# Patient Record
Sex: FEMALE | Employment: UNEMPLOYED | URBAN - METROPOLITAN AREA
[De-identification: names, ages, dates, MRNs, and addresses within clinical notes are randomized per-mention and may not be internally consistent; named-entity substitution may affect disease eponyms.]

---

## 2017-10-25 ENCOUNTER — HOSPITAL ENCOUNTER (EMERGENCY)
Age: 57
Discharge: HOME | End: 2017-10-25
Attending: EMERGENCY MEDICINE
Payer: SELF-PAY

## 2017-10-25 ENCOUNTER — APPOINTMENT (OUTPATIENT)
Dept: GENERAL RADIOLOGY | Age: 57
End: 2017-10-25
Attending: EMERGENCY MEDICINE
Payer: SELF-PAY

## 2017-10-25 VITALS
HEIGHT: 61 IN | SYSTOLIC BLOOD PRESSURE: 122 MMHG | WEIGHT: 120 LBS | TEMPERATURE: 98 F | BODY MASS INDEX: 22.66 KG/M2 | DIASTOLIC BLOOD PRESSURE: 77 MMHG | RESPIRATION RATE: 16 BRPM | OXYGEN SATURATION: 98 % | HEART RATE: 80 BPM

## 2017-10-25 DIAGNOSIS — R00.0 TACHYCARDIA: Primary | ICD-10-CM

## 2017-10-25 LAB
ANION GAP SERPL CALC-SCNC: 5 MMOL/L (ref 3–18)
BASOPHILS # BLD: 0.1 K/UL (ref 0–0.06)
BASOPHILS NFR BLD: 1 % (ref 0–2)
BUN SERPL-MCNC: 13 MG/DL (ref 7–18)
BUN/CREAT SERPL: 12 (ref 12–20)
CALCIUM SERPL-MCNC: 9.1 MG/DL (ref 8.5–10.1)
CHLORIDE SERPL-SCNC: 109 MMOL/L (ref 100–108)
CK MB CFR SERPL CALC: 0.6 % (ref 0–4)
CK MB SERPL-MCNC: 1.3 NG/ML (ref 5–25)
CK SERPL-CCNC: 212 U/L (ref 26–192)
CO2 SERPL-SCNC: 29 MMOL/L (ref 21–32)
CREAT SERPL-MCNC: 1.05 MG/DL (ref 0.6–1.3)
D DIMER PPP FEU-MCNC: 0.29 UG/ML(FEU)
DIFFERENTIAL METHOD BLD: ABNORMAL
EOSINOPHIL # BLD: 0.3 K/UL (ref 0–0.4)
EOSINOPHIL NFR BLD: 3 % (ref 0–5)
ERYTHROCYTE [DISTWIDTH] IN BLOOD BY AUTOMATED COUNT: 12.7 % (ref 11.6–14.5)
GLUCOSE SERPL-MCNC: 106 MG/DL (ref 74–99)
HCT VFR BLD AUTO: 41.1 % (ref 35–45)
HGB BLD-MCNC: 14.1 G/DL (ref 12–16)
LYMPHOCYTES # BLD: 2.9 K/UL (ref 0.9–3.6)
LYMPHOCYTES NFR BLD: 33 % (ref 21–52)
MAGNESIUM SERPL-MCNC: 2.2 MG/DL (ref 1.6–2.6)
MCH RBC QN AUTO: 30.1 PG (ref 24–34)
MCHC RBC AUTO-ENTMCNC: 34.3 G/DL (ref 31–37)
MCV RBC AUTO: 87.6 FL (ref 74–97)
MONOCYTES # BLD: 0.6 K/UL (ref 0.05–1.2)
MONOCYTES NFR BLD: 7 % (ref 3–10)
NEUTS SEG # BLD: 4.9 K/UL (ref 1.8–8)
NEUTS SEG NFR BLD: 56 % (ref 40–73)
PLATELET # BLD AUTO: 237 K/UL (ref 135–420)
PMV BLD AUTO: 10 FL (ref 9.2–11.8)
POTASSIUM SERPL-SCNC: 3.4 MMOL/L (ref 3.5–5.5)
RBC # BLD AUTO: 4.69 M/UL (ref 4.2–5.3)
SODIUM SERPL-SCNC: 143 MMOL/L (ref 136–145)
TROPONIN I SERPL-MCNC: <0.02 NG/ML (ref 0–0.06)
TSH SERPL DL<=0.05 MIU/L-ACNC: 2.02 UIU/ML (ref 0.36–3.74)
WBC # BLD AUTO: 8.7 K/UL (ref 4.6–13.2)

## 2017-10-25 PROCEDURE — 74011250637 HC RX REV CODE- 250/637: Performed by: EMERGENCY MEDICINE

## 2017-10-25 PROCEDURE — 74011250636 HC RX REV CODE- 250/636: Performed by: EMERGENCY MEDICINE

## 2017-10-25 PROCEDURE — 85379 FIBRIN DEGRADATION QUANT: CPT | Performed by: EMERGENCY MEDICINE

## 2017-10-25 PROCEDURE — 93005 ELECTROCARDIOGRAM TRACING: CPT

## 2017-10-25 PROCEDURE — 84443 ASSAY THYROID STIM HORMONE: CPT | Performed by: EMERGENCY MEDICINE

## 2017-10-25 PROCEDURE — 85025 COMPLETE CBC W/AUTO DIFF WBC: CPT | Performed by: EMERGENCY MEDICINE

## 2017-10-25 PROCEDURE — 80048 BASIC METABOLIC PNL TOTAL CA: CPT | Performed by: EMERGENCY MEDICINE

## 2017-10-25 PROCEDURE — 82550 ASSAY OF CK (CPK): CPT | Performed by: EMERGENCY MEDICINE

## 2017-10-25 PROCEDURE — 99285 EMERGENCY DEPT VISIT HI MDM: CPT

## 2017-10-25 PROCEDURE — 96361 HYDRATE IV INFUSION ADD-ON: CPT

## 2017-10-25 PROCEDURE — 96360 HYDRATION IV INFUSION INIT: CPT

## 2017-10-25 PROCEDURE — 83735 ASSAY OF MAGNESIUM: CPT | Performed by: EMERGENCY MEDICINE

## 2017-10-25 RX ORDER — ALPRAZOLAM 0.25 MG/1
TABLET ORAL
COMMUNITY

## 2017-10-25 RX ORDER — SODIUM CHLORIDE 9 MG/ML
1000 INJECTION, SOLUTION INTRAVENOUS ONCE
Status: COMPLETED | OUTPATIENT
Start: 2017-10-25 | End: 2017-10-25

## 2017-10-25 RX ORDER — POTASSIUM CHLORIDE 20 MEQ/1
20 TABLET, EXTENDED RELEASE ORAL
Status: COMPLETED | OUTPATIENT
Start: 2017-10-25 | End: 2017-10-25

## 2017-10-25 RX ADMIN — SODIUM CHLORIDE 1000 ML: 900 INJECTION, SOLUTION INTRAVENOUS at 20:51

## 2017-10-25 RX ADMIN — POTASSIUM CHLORIDE 20 MEQ: 20 TABLET, EXTENDED RELEASE ORAL at 22:16

## 2017-10-26 LAB
ATRIAL RATE: 98 BPM
CALCULATED P AXIS, ECG09: 63 DEGREES
CALCULATED R AXIS, ECG10: 67 DEGREES
CALCULATED T AXIS, ECG11: 51 DEGREES
DIAGNOSIS, 93000: NORMAL
P-R INTERVAL, ECG05: 156 MS
Q-T INTERVAL, ECG07: 338 MS
QRS DURATION, ECG06: 76 MS
QTC CALCULATION (BEZET), ECG08: 431 MS
VENTRICULAR RATE, ECG03: 98 BPM

## 2017-10-26 NOTE — DISCHARGE INSTRUCTIONS
Return for pain, fever, shortness of breath, vomiting, decreased fluid intake, weakness, numbness, dizziness, or any change or concerns. Supraventricular Tachycardia: Care Instructions  Your Care Instructions    Having supraventricular tachycardia (SVT) means that from time to time your heart beats abnormally fast. This fast rhythm is caused by changes in the electrical system of your heart. You may feel a fluttering in your chest (palpitations) and have a fast pulse. When your heart is beating fast, you may feel anxious and lightheaded, be short of breath, and feel discomfort in the chest.  Your doctor may prescribe medicines to help slow down your heartbeat. Your doctor may also suggest you try vagal maneuvers when having an episode of SVT. These are things, like bearing down, that might help slow your heart rate. Bearing down means that you try to breathe out with your stomach muscles but you don't let air out of your nose or mouth. Your doctor can show you how to do vagal maneuvers. He or she may suggest you lie down on your back to do them. In some cases, either cardioversion treatment or a procedure called catheter ablation is done to correct SVT. Your doctor may ask you to wear a small electronic device for 1 or 2 days to monitor your heart. It is called a Holter monitor. Follow-up care is a key part of your treatment and safety. Be sure to make and go to all appointments, and call your doctor if you are having problems. It's also a good idea to know your test results and keep a list of the medicines you take. How can you care for yourself at home? · Be safe with medicines. Take your medicines exactly as prescribed. Call your doctor if you think you are having a problem with your medicine. You will get more details on the specific medicines your doctor prescribes. · If your doctor showed you how to do vagal maneuvers, try them when you have an episode.  These maneuvers include bearing down or putting an ice-cold, wet towel on your face. · Monitor your condition by keeping a diary of your SVT episodes. Bring this to your doctor appointments. ¨ Write down how fast or slow your heart was beating. To count your heart rate:  § Gently place 2 fingers of your hand on the inside of your other wrist, below your thumb. § Count the beats for 30 seconds. § Then, double the result to get the number of beats per minute. ¨ Write down if your heart rhythm was regular or irregular. ¨ Write down the symptoms you had. ¨ Write down the time of day your symptoms occurred. ¨ Write down how long your symptoms lasted. ¨ Write down what you were doing when your symptoms started. ¨ Write down what may have helped your symptoms go away. · If they trigger episodes, limit or avoid alcohol or drinks with caffeine. · Do not use over-the-counter decongestants, herbal remedies, diet pills, or \"pep\" pills, which often contain stimulants. · Do not use illegal drugs, such as cocaine, ecstasy, or methamphetamine, which can speed up your heart's rhythm. · Do not smoke. Smoking can make this condition worse. If you need help quitting, talk to your doctor about stop-smoking programs and medicines. These can increase your chances of quitting for good. · Be alert for new or worsening symptoms, such as shortness of breath, pounding of your heart, or unusual tiredness. If new symptoms develop or your symptoms become worse, call your doctor. When should you call for help? Call 911 anytime you think you may need emergency care. For example, call if:  ? · You passed out (lost consciousness). ? · You are short of breath. ?Call your doctor now or seek immediate medical care if:  ? · You have a fast heartbeat. ? · You are dizzy or lightheaded, or feel like you may faint. ? Watch closely for changes in your health, and be sure to contact your doctor if:  ? · You do not get better as expected. Where can you learn more?   Go to http://izabela-deidre.info/. Enter G244 in the search box to learn more about \"Supraventricular Tachycardia: Care Instructions. \"  Current as of: September 21, 2016  Content Version: 11.4  © 8365-3628 [employer]. Care instructions adapted under license by [employer] (which disclaims liability or warranty for this information). If you have questions about a medical condition or this instruction, always ask your healthcare professional. [place] [address] any warranty or liability for your use of this information. Palpitations: Care Instructions  Your Care Instructions    Heart palpitations are the uncomfortable sensation that your heart is beating fast or irregularly. You might feel pounding or fluttering in your chest. It might feel like your heart is skipping a beat. Although palpitations may be caused by a heart problem, they also occur because of stress, fatigue, or use of alcohol, caffeine, or nicotine. Many medicines, including diet pills, antihistamines, decongestants, and some herbal products, can cause heart palpitations. Nearly everyone has palpitations from time to time. Depending on your symptoms, your doctor may need to do more tests to try to find the cause of your palpitations. Follow-up care is a key part of your treatment and safety. Be sure to make and go to all appointments, and call your doctor if you are having problems. It's also a good idea to know your test results and keep a list of the medicines you take. How can you care for yourself at home? · Avoid caffeine, nicotine, and excess alcohol. · Do not take illegal drugs, such as methamphetamines and cocaine. · Do not take weight loss or diet medicines unless you talk with your doctor first.  · Get plenty of sleep. · Do not overeat. · If you have palpitations again, take deep breaths and try to relax. This may slow a racing heart.   · If you start to feel lightheaded, lie down to avoid injuries that might result if you pass out and fall down. · Keep a record of your palpitations and bring it to your next doctor's appointment. Write down:  ¨ The date and time. ¨ Your pulse. (If your heart is beating fast, it may be hard to count your pulse.)  ¨ What you were doing when the palpitations started. ¨ How long the palpitations lasted. ¨ Any other symptoms. · If an activity causes palpitations, slow down or stop. Talk to your doctor before you do that activity again. · Take your medicines exactly as prescribed. Call your doctor if you think you are having a problem with your medicine. When should you call for help? Call 911 anytime you think you may need emergency care. For example, call if:  ? · You passed out (lost consciousness). ? · You have symptoms of a heart attack. These may include:  ¨ Chest pain or pressure, or a strange feeling in the chest.  ¨ Sweating. ¨ Shortness of breath. ¨ Pain, pressure, or a strange feeling in the back, neck, jaw, or upper belly or in one or both shoulders or arms. ¨ Lightheadedness or sudden weakness. ¨ A fast or irregular heartbeat. After you call 911, the  may tell you to chew 1 adult-strength or 2 to 4 low-dose aspirin. Wait for an ambulance. Do not try to drive yourself. ? · You have symptoms of a stroke. These may include:  ¨ Sudden numbness, tingling, weakness, or loss of movement in your face, arm, or leg, especially on only one side of your body. ¨ Sudden vision changes. ¨ Sudden trouble speaking. ¨ Sudden confusion or trouble understanding simple statements. ¨ Sudden problems with walking or balance. ¨ A sudden, severe headache that is different from past headaches. ?Call your doctor now or seek immediate medical care if:  ? · You have heart palpitations and:  ¨ Are dizzy or lightheaded, or you feel like you may faint. ¨ Have new or increased shortness of breath. ? Watch closely for changes in your health, and be sure to contact your doctor if:  ? · You continue to have heart palpitations. Where can you learn more? Go to http://izabela-deidre.info/. Enter R508 in the search box to learn more about \"Palpitations: Care Instructions. \"  Current as of: September 21, 2016  Content Version: 11.4  © 0729-0804 [employer]. Care instructions adapted under license by [employer] (which disclaims liability or warranty for this information). If you have questions about a medical condition or this instruction, always ask your healthcare professional. [place] [address] any warranty or liability for your use of this information.

## 2017-10-26 NOTE — ED PROVIDER NOTES
HPI Comments: 8:41 PM Syd Agrawal is a 55 y.o. female with a h/o SVT presents to the ED with c/o rapid heart rate onset today. Long h/o same, off and on for yrs. No fever. No sob. No chest pain. Sx's x 2 hours, constant, better now. Pt states she drank a Coke today. Pt reported dizziness, and fatigue. Pt denied CP, n/v/d, SOB, fever, weakness, numbness, ebenezer, or cough. All other sx denied. No other complaints at this time. PCP-No primary care provider on file. Patient is a 55 y.o. female presenting with rapid heart beat. The history is provided by the patient. Rapid Heart Rate   Pertinent negatives include no chest pain, no abdominal pain and no shortness of breath. Past Medical History:   Diagnosis Date    Hypercholesteremia     SVT (supraventricular tachycardia) ([facility] [address]        History reviewed. No pertinent surgical history. History reviewed. No pertinent family history. Social History     Social History    Marital status:      Spouse name: N/A    Number of children: N/A    Years of education: N/A     Occupational History    Not on file. Social History Main Topics    Smoking status: Current Every Day Smoker     Packs/day: 1.00    Smokeless tobacco: Not on file    Alcohol use No    Drug use: No    Sexual activity: Not on file     Other Topics Concern    Not on file     Social History Narrative    No narrative on file         ALLERGIES: Codeine and Penicillins    Review of Systems   Constitutional: Negative for fever. HENT: Negative for congestion. Respiratory: Negative for cough and shortness of breath. Cardiovascular: Positive for palpitations. Negative for chest pain. Gastrointestinal: Negative for abdominal pain, blood in stool, diarrhea, nausea and vomiting. Musculoskeletal: Negative for back pain. Skin: Negative for rash. Neurological: Positive for dizziness. Negative for weakness, light-headedness and numbness.    All other systems reviewed and are negative. Vitals:    10/25/17 2130 10/25/17 2156 10/25/17 2200 10/25/17 2215   BP: 117/81 105/70 116/83 122/77   Pulse: 87 79 77 80   Resp: 15 19 16 16   Temp:       SpO2: 99% 97% 97% 98%   Weight:       Height:                Physical Exam   Constitutional: She is oriented to person, place, and time. She appears well-developed. HENT:   Head: Normocephalic. Mouth/Throat: Oropharynx is clear and moist.   Eyes: Pupils are equal, round, and reactive to light. Neck: Normal range of motion. Cardiovascular: Normal rate and normal heart sounds. No murmur heard. Pulmonary/Chest: Effort normal. She has no wheezes. She has no rales. Abdominal: Soft. There is no tenderness. Musculoskeletal: Normal range of motion. She exhibits no edema. Neurological: She is alert and oriented to person, place, and time. Skin: Skin is warm and dry. Nursing note and vitals reviewed.        [facility]  ED Course       Procedures      Vitals:  Patient Vitals for the past 12 hrs:   Temp Pulse Resp BP SpO2   10/25/17 2215 - 80 16 122/77 98 %   10/25/17 2200 - 77 16 116/83 97 %   10/25/17 2156 - 79 19 105/70 97 %   10/25/17 2130 - 87 15 117/81 99 %   10/25/17 2100 - 88 19 116/84 97 %   10/25/17 2045 - 94 15 131/83 96 %   10/25/17 2042 98 °F (36.7 °C) (!) 108 23 120/86 98 %         Medications ordered:   Medications   0.9% sodium chloride infusion 1,000 mL (0 mL IntraVENous IV Completed 10/25/17 2231)   potassium chloride (K-DUR, KLOR-CON) SR tablet 20 mEq (20 mEq Oral Given 10/25/17 2216)         Lab findings:  Recent Results (from the past 12 hour(s))   EKG, 12 LEAD, INITIAL    Collection Time: 10/25/17  8:43 PM   Result Value Ref Range    Ventricular Rate 98 BPM    Atrial Rate 98 BPM    P-R Interval 156 ms    QRS Duration 76 ms    Q-T Interval 338 ms    QTC Calculation (Bezet) 431 ms    Calculated P Axis 63 degrees    Calculated R Axis 67 degrees    Calculated T Axis 51 degrees    Diagnosis       Normal sinus rhythm  Possible Left atrial enlargement  Borderline ECG  When compared with ECG of 22-FEB-2009 14:41,  No significant change was found     TSH 3RD GENERATION    Collection Time: 10/25/17  8:50 PM   Result Value Ref Range    TSH 2.02 0.36 - 3.74 uIU/mL   CBC WITH AUTOMATED DIFF    Collection Time: 10/25/17  8:50 PM   Result Value Ref Range    WBC 8.7 4.6 - 13.2 K/uL    RBC 4.69 4.20 - 5.30 M/uL    HGB 14.1 12.0 - 16.0 g/dL    HCT 41.1 35.0 - 45.0 %    MCV 87.6 74.0 - 97.0 FL    MCH 30.1 24.0 - 34.0 PG    MCHC 34.3 31.0 - 37.0 g/dL    RDW 12.7 11.6 - 14.5 %    PLATELET 016 961 - 321 K/uL    MPV 10.0 9.2 - 11.8 FL    NEUTROPHILS 56 40 - 73 %    LYMPHOCYTES 33 21 - 52 %    MONOCYTES 7 3 - 10 %    EOSINOPHILS 3 0 - 5 %    BASOPHILS 1 0 - 2 %    ABS. NEUTROPHILS 4.9 1.8 - 8.0 K/UL    ABS. LYMPHOCYTES 2.9 0.9 - 3.6 K/UL    ABS. MONOCYTES 0.6 0.05 - 1.2 K/UL    ABS. EOSINOPHILS 0.3 0.0 - 0.4 K/UL    ABS.  BASOPHILS 0.1 (H) 0.0 - 0.06 K/UL    DF AUTOMATED     METABOLIC PANEL, BASIC    Collection Time: 10/25/17  8:50 PM   Result Value Ref Range    Sodium 143 136 - 145 mmol/L    Potassium 3.4 (L) 3.5 - 5.5 mmol/L    Chloride 109 (H) 100 - 108 mmol/L    CO2 29 21 - 32 mmol/L    Anion gap 5 3.0 - 18 mmol/L    Glucose 106 (H) 74 - 99 mg/dL    BUN 13 7.0 - 18 MG/DL    Creatinine 1.05 0.6 - 1.3 MG/DL    BUN/Creatinine ratio 12 12 - 20      GFR est AA >60 >60 ml/min/1.73m2    GFR est non-AA 54 (L) >60 ml/min/1.73m2    Calcium 9.1 8.5 - 10.1 MG/DL   CARDIAC PANEL,(CK, CKMB & TROPONIN)    Collection Time: 10/25/17  8:50 PM   Result Value Ref Range     (H) 26 - 192 U/L    CK - MB 1.3 <3.6 ng/ml    CK-MB Index 0.6 0.0 - 4.0 %    Troponin-I, Qt. <0.02 0.00 - 0.06 NG/ML   MAGNESIUM    Collection Time: 10/25/17  8:50 PM   Result Value Ref Range    Magnesium 2.2 1.6 - 2.6 mg/dL   D DIMER    Collection Time: 10/25/17  8:50 PM   Result Value Ref Range    D DIMER 0.29 <0.46 ug/ml(FEU)           X-Ray, CT or other radiology findings or impressions:  No orders to display       Progress notes, Consult notes or additional Procedure notes:   10:25 PM  Pt has been re-examined by Cleta Dance, MD.no sx's in ed. Pt declines cxr, declines further ed obs or tx. Wants dc. Says long h/o same. Pt is resting comfortably in bed. Pt was informed of their results. All questions and concerns discussed and answered. Pt declined further tx and wants to go home. Disposition:  Diagnosis:   1. Tachycardia        Disposition: discharge    Follow-up Information     Follow up With Details Comments [address] Schedule an appointment as soon as possible for a visit in 2 days  [address], DO Schedule an appointment as soon as possible for a visit As needed, or your cardiologist [address]  [phone]             Discharge Medication List as of 10/25/2017 10:38 PM      CONTINUE these medications which have NOT CHANGED    Details   DILTIAZEM HCL (CARDIZEM PO) Take  by mouth., Historical Med      ALPRAZolam (XANAX) 0.25 mg tablet Take  by mouth., Historical Med               Scribe [id number] acting as a scribe for and in the presence of Cleta Dance, MD      October 26, 2017 at 6:18 AM       Provider Attestation:      I personally performed the services described in the documentation, reviewed the documentation, as recorded by the scribe in my presence, and it accurately and completely records my words and actions.  October 26, 2017 at 6:18 AM - Cleta Dance, MD

## 2017-10-26 NOTE — ED NOTES
Patient advises nursing that she was experiencing SVT prior to arrival to ER. States hx of paroxysmal SVT. Patient states that she takes xanax and other medication when SVT occurs. Denies chest pain or shortness of breath.

## 2017-11-01 ENCOUNTER — TELEPHONE (OUTPATIENT)
Dept: CARDIOLOGY CLINIC | Age: 57
End: 2017-11-01

## 2017-11-01 NOTE — TELEPHONE ENCOUNTER
Spoke with patient . .. She states that she does not wish to schedule an appointment, as she already has a cardiologist.        Referral  Received: 4 days ago       DO Jeniffer Martino                     This was in my box, so I am assuming that they want us to see this patient.  Please review the chart and schedule the patient with 1 of us.  ES            Previous Messages       ----- Message -----      From: Jose Enrique Ward MD      Sent: 10/26/2017  10:43 AM        To: Saúl Goins DO

## 2018-02-28 ENCOUNTER — HOSPITAL ENCOUNTER (OUTPATIENT)
Age: 58
Discharge: HOME | End: 2018-02-28
Attending: INTERNAL MEDICINE
Payer: COMMERCIAL

## 2018-02-28 DIAGNOSIS — M54.89 OTHER DORSALGIA (CODE): ICD-10-CM

## 2018-02-28 DIAGNOSIS — M54.2 CERVICALGIA: ICD-10-CM

## 2018-02-28 PROCEDURE — 72146 MRI CHEST SPINE W/O DYE: CPT

## 2018-02-28 PROCEDURE — 72141 MRI NECK SPINE W/O DYE: CPT

## 2018-03-09 ENCOUNTER — HOSPITAL ENCOUNTER (OUTPATIENT)
Dept: ULTRASOUND IMAGING | Age: 58
Discharge: HOME | End: 2018-03-09
Attending: INTERNAL MEDICINE
Payer: COMMERCIAL

## 2018-03-09 DIAGNOSIS — E04.2 NONTOXIC MULTINODULAR GOITER: ICD-10-CM

## 2018-03-09 PROCEDURE — 76536 US EXAM OF HEAD AND NECK: CPT

## 2018-03-19 ENCOUNTER — OFFICE VISIT (OUTPATIENT)
Dept: ORTHOPEDIC SURGERY | Age: 58
End: 2018-03-19

## 2018-03-19 VITALS
BODY MASS INDEX: 23.79 KG/M2 | RESPIRATION RATE: 18 BRPM | HEIGHT: 61 IN | OXYGEN SATURATION: 99 % | SYSTOLIC BLOOD PRESSURE: 138 MMHG | WEIGHT: 126 LBS | TEMPERATURE: 98.4 F | DIASTOLIC BLOOD PRESSURE: 68 MMHG | HEART RATE: 76 BPM

## 2018-03-19 DIAGNOSIS — M79.18 MYOFASCIAL PAIN: ICD-10-CM

## 2018-03-19 DIAGNOSIS — M54.12 CERVICAL RADICULOPATHY: Primary | ICD-10-CM

## 2018-03-19 RX ORDER — CHOLECALCIFEROL (VITAMIN D3) 125 MCG
2 CAPSULE ORAL DAILY
COMMUNITY

## 2018-03-19 RX ORDER — DILTIAZEM HYDROCHLORIDE 360 MG/1
1 CAPSULE, EXTENDED RELEASE ORAL DAILY
Refills: 1 | COMMUNITY
Start: 2018-03-14

## 2018-03-19 NOTE — PROGRESS NOTES
[address], [address]  [facility], [address]  Phone: [phone]  Fax: [phone]        Robert Odonnell  : 1960  PCP: Isaiah Clark MD  3/19/2018    NEW PATIENT      ASSESSMENT AND PLAN     Rony Ramirez comes in to the office today c/o neck pain radiating into her left arm x 4 months that she rates as a 4/10 today. Her numbness and paraesthesia run along the posterior aspect of her LUE into her pinky and ring finger, along a C8 distribution. The pain associated has slightly improved, but she has seen no changes in her radicular symptoms. She notes she occasionally drops items she holds in her left hand. Her MRI reveals a disc protrusion eccentric towards the left causing a left foraminal stenosis at C7-T1 that likely correlates with her symptoms. The most significant finding is foraminal stenosis on the right. This side is asymptomatic. On examination, she had tenderness to palpation of her periscapular musculature L>R and a positive Spurling's sign on the L. Her pain is likely cervical radiculopathy with a component of myofascial pain. We discussed cervical injections vs EMG/NCS vs surgical consult. I referred for a cervical epidural and PT with Christian therapy and optional dry needling. She is scheduled to f/u with an endocrinologist for thyroid cysts evidenced on her cervical MRI. I advised she quit smoking and advised on posture. Pt will f/u in 8 weeks or sooner if needed. Diagnoses and all orders for this visit:    1. Cervical radiculopathy  -     REFERRAL TO PAIN MANAGEMENT    2. Myofascial pain  -     REFERRAL TO PHYSICAL THERAPY       Follow-up Disposition: Not on File    CHIEF COMPLAINT  Rony Ramirez is seen today in consultation at the request of Isaiah Clark MD for complaints of neck pain.       HISTORY OF PRESENT ILLNESS  Rony Ramirez is a 65 y.o. female c/o neck pain radiating into her left arm x 4 months. She has numbness and tingling in the posterior aspect of her arm into her pinkie and ring finger on her left hand. She has seen some improvement in her neck pain, but her radicular symptoms have not seen any change. She occasionally drops objects she holds in her left hand. She has seen pain relief from a heating pad. She also found relief from a steroid pack. Pt denies any fevers, chills, nausea, vomiting. Pt denies any chest pain and shortness of breath. Pt denies any ear, nose, and throat problems. Pt denies any fecal or urinary incontinence. PAST MEDICAL HISTORY   Past Medical History:   Diagnosis Date    Hypercholesteremia     SVT (supraventricular tachycardia) ([facility] [address]        No past surgical history on file. MEDICATIONS    Current Outpatient Prescriptions   Medication Sig Dispense Refill    dilTIAZem (TIAZAC) 360 mg ER capsule Take 1 Cap by mouth daily. 1    TURMERIC ROOT EXTRACT PO Take 1 Caplet by mouth daily.  cholecalciferol, vitamin D3, (VITAMIN D3) 2,000 unit tab Take 2 Tabs by mouth daily.  ALPRAZolam (XANAX) 0.25 mg tablet Take  by mouth. ALLERGIES  Allergies   Allergen Reactions    Codeine Other (comments)    Penicillins Other (comments)          SOCIAL HISTORY    Social History     Social History    Marital status:      Spouse name: N/A    Number of children: N/A    Years of education: N/A     Social History Main Topics    Smoking status: Current Every Day Smoker     Packs/day: 1.00    Smokeless tobacco: Not on file    Alcohol use No    Drug use: No    Sexual activity: Not on file     Other Topics Concern    Not on file     Social History Narrative    No narrative on file       FAMILY HISTORY  No family history on file. REVIEW OF SYSTEMS  Review of Systems   Musculoskeletal: Positive for neck pain.         LUE paraesthesia and numbness         PHYSICAL EXAMINATION  Visit Vitals    /68    Pulse 76    Temp 98.4 °F (36.9 °C)    Resp 18    Ht 5' 1\" (1.549 m)    Wt 126 lb (57.2 kg)    SpO2 99%    BMI 23.81 kg/m2         No flowsheet data found. Constitutional:  Well developed, well nourished, in no acute distress. Psychiatric: Affect and mood are appropriate. HEENT: Normocephalic, atraumatic. Extraocular movements intact. Integumentary: No rashes or abrasions noted on exposed areas. Cardiovascular: Regular rate and rhythm. Pulmonary: Clear to auscultation bilaterally. SPINE/MUSCULOSKELETAL EXAM    Cervical spine:  Neck is midline. Normal muscle tone. No focal atrophy is noted. ROM pain free. Shoulder ROM intact. Tenderness to palpation of cervical paraspinals L>R  Positive Spurling's sign on Left. Negative Tinel's sign. Negative Gonsales's sign. Sensation in the bilateral arms grossly intact to light touch. Lumbar spine:  No rash, ecchymosis, or gross obliquity. No fasciculations. No focal atrophy is noted. No pain with hip ROM. Full range of motion. No tenderness to palpation. No tenderness to palpation at the sciatic notch. SI joints non-tender. Trochanters non tender. Sensation in the bilateral legs grossly intact to light touch. MOTOR:      Biceps  Triceps Deltoids Wrist Ext Wrist Flex Hand Intrin   Right 5/5 5/5 5/5 5/5 5/5 5/5   Left 5/5 5/5 5/5 5/5 5/5 5/5             Hip Flex  Quads Hamstrings Ankle DF EHL Ankle PF   Right 5/5 5/5 5/5 5/5 5/5 5/5   Left 5/5 5/5 5/5 5/5 5/5 5/5     DTRs are 1+ biceps, triceps, brachioradialis, patella, and Achilles. Negative Straight Leg raise. Squat not tested. No difficulty with tandem gait. Ambulation without assistive device. FWB. RADIOGRAPHS  Cervical MRI images taken on 2/28/18 personally reviewed with patient:  FINDINGS:      Mild straightening of cervical lordosis and suggestion of upper cervical  thoracic scoliosis, not well evaluated.  Grade 1 trace anterolisthesis of C3 in  relation to C4, approximately 3 mm. Mild degenerative type I endplate changes at  X3/N6. Several small scattered hemangiomata within the vertebral bodies. Craniocervical junction and posterior elements are intact. Prevertebral soft  tissues are normal. Incidentally imaged cervical soft tissues demonstrate no  acute abnormalities. There is a large dominant mass partially imaged at the left  thyroid lobe, at least 3 x 2.3 cm. Cervical spinal cord maintains normal  caliber, signal intensity and morphology throughout within limitations of motion  artifact.     C2/C3: Central canal and neural foramina are patent.     C3/C4: Central canal and neural foramina are patent.     C4/C5: Grade 1 anterolisthesis. Disc osteophyte complex without cord deformity. Foramina are adequate.     C5/C6: Disc osteophyte complex without cord deformity. Severe left foraminal  stenosis due to uncovertebral spurring and facet hypertrophy. Moderate right  foraminal stenosis due to the same.     C6/C7: Mild disc osteophyte complex eccentric towards the right foramen where  there may be at least moderate to severe right foraminal stenosis due to  uncovertebral spurring and facet hypertrophy. Mild to moderate left foraminal  stenosis. There may be minimal contact with the ventral cord, but no cord  deformity.     C7/T1: Disc protrusion eccentric towards the left neural foramen may contribute  to moderate left foraminal stenosis. Right neural foramen is patent. Central  canal is patent.     T1/T2 through T4/T5: Central canal and foramina are adequate.              IMPRESSION  IMPRESSION:     Degenerative changes of the cervical spine are most pronounced at C4/C5 where  there is grade 1 degenerative anterolisthesis and at C5/C6 where there are  degenerative type I endplate changes and severe left foraminal stenosis.     At least moderate to severe right foraminal stenosis at C6/C7.     No critical central canal stenosis at any level.  Please see additional details  above.     Incidental dominant 3 cm left thyroid mass recommend consideration for thyroid  ultrasound to determine whether further evaluation is warranted. Thoracic MRI images taken on 2/28/18 personally reviewed with patient:  FINDINGS:      Mild loss of vertebral body height along the superior endplate of T6 without  acute reactive marrow changes to suggest recent injury. There are T1 and STIR  hyperintense bone lesions at the L1 and L2 vertebra, likely representing  atypical hemangiomata. Several scattered osseous hemangiomata are also noted. Trace degenerative type I endplate change anteriorly at T8/T9. Thoracic spinal  cord maintains normal caliber, signal intensity and morphology throughout. There  are several tiny disc protrusions without impact upon the cord at any level. Disc protrusion most pronounced at T8/T9 where there is mild encroachment upon  the central canal. At T10/T11, there is mild circumferential bulging of the disc  and facet hypertrophy which mildly encroaches upon the central canal and  foramina. Disc protrusion at L2/L3 contributes to at least mild central canal  stenosis. There are multiple thyroid masses, with dominant mass at the left  thyroid lobe measuring a craniocaudal distance of 5 cm, possibly extending  minimally substernal. There is also a nodule at the lateral right thyroid lobe  which measures 9.2 mm. T2 hyperintense lesion at the right hepatic dome and  lateral right liver are incompletely evaluated, potentially cysts.                    IMPRESSION  IMPRESSION:     Minimal degenerative changes of the thoracic spine are noted above without  significant central canal or significant foraminal compromise.     Dominant 5 cm mass left thyroid and small nodule right lateral thyroid.   Recommend consideration for thyroid ultrasound to determine whether further  evaluation is warranted.     Partially imaged hepatic lesions, possibly cysts, incompletely evaluated.        reviewed    Ms. Pak has a reminder for a \"due or due soon\" health maintenance. I have asked that she contact her primary care provider for follow-up on this health maintenance. 26 minutes of face-to-face contact were spent with the patient during today's visit extensively discussing symptoms and treatment plan. All questions were answered. More than half of this visit today was spent on counseling. Written by Shakir Mccartney, as dictated by Dr. Amos Gilford. I, Dr. Amos Gilford, confirm that all documentation is accurate.

## 2018-03-19 NOTE — MR AVS SNAPSHOT
[address] [address] [address] [place] [address] 
[phone] Patient: Haja Ramirez MRN: [id number] :1960 Visit Information Date & Time Provider Department Dept. Phone Encounter #  
 3/19/2018  2:00 PM Jos Valentine MD [facility] [phone] [id number] Follow-up Instructions Return in about 8 weeks (around 2018). Upcoming Health Maintenance Date Due Hepatitis C Screening 1960 Pneumococcal 19-64 Medium Risk (1 of 1 - PPSV23) 1980 DTaP/Tdap/Td series (1 - Tdap) 1982 PAP AKA CERVICAL CYTOLOGY 1982 BREAST CANCER SCRN MAMMOGRAM 2011 FOBT Q 1 YEAR AGE 50-75 2011 Influenza Age 5 to Adult 2017 Allergies as of 3/19/2018  Review Complete On: 3/19/2018 By: Bipin Mejia Severity Noted Reaction Type Reactions Codeine  10/25/2017    Other (comments) Penicillins  10/25/2017    Other (comments) Current Immunizations  Never Reviewed No immunizations on file. Not reviewed this visit You Were Diagnosed With   
  
 Codes Comments Cervical radiculopathy    -  Primary ICD-10-CM: M54.12 
ICD-9-CM: 723.4 Myofascial pain     ICD-10-CM: M79.1 ICD-9-CM: 729.1 Vitals BP Pulse Temp Resp Height(growth percentile) Weight(growth percentile)  
 138/68 76 98.4 °F (36.9 °C) 18 5' 1\" (1.549 m) 126 lb (57.2 kg) SpO2 BMI Smoking Status 99% 23.81 kg/m2 Current Every Day Smoker BMI and BSA Data Body Mass Index Body Surface Area  
 23.81 kg/m 2 1.57 m 2 Preferred Pharmacy Pharmacy Name Phone [facility] [address] [facility], [address] [facility] [phone] Your Updated Medication List  
  
   
This list is accurate as of 3/19/18  3:40 PM.  Always use your most recent med list.  
  
  
  
  
 dilTIAZem 360 mg ER capsule Commonly known as:  [facility] Take 1 Cap by mouth daily. TURMERIC ROOT EXTRACT PO Take 1 Caplet by mouth daily. VITAMIN D3 2,000 unit Tab Generic drug:  cholecalciferol (vitamin D3) Take 2 Tabs by mouth daily. XANAX 0.25 mg tablet Generic drug:  ALPRAZolam  
Take  by mouth. We Performed the Following REFERRAL TO PAIN MANAGEMENT [SLO148 Custom] Comments:  
 Cervical epidural  
 REFERRAL TO PHYSICAL THERAPY [AEK23 Custom] Comments:  
 eval and treat Neck pain Include Christian therapy Optional dry needling Follow-up Instructions Return in about 8 weeks (around 5/14/2018). Referral Information Referral ID Referred By Referred To  
  
 [id number] Virgilio Ta MD   
   [address] [facility] [place], [address] Phone: [phone] Fax: [phone] Visits Status Start Date End Date 1 New Request 3/19/18 3/19/19 If your referral has a status of pending review or denied, additional information will be sent to support the outcome of this decision. Referral ID Referred By Referred To  
 [id number] [facility] [address], [address] [place], [address] Phone: [phone] Visits Status Start Date End Date 1 New Request 3/19/18 3/19/19 If your referral has a status of pending review or denied, additional information will be sent to support the outcome of this decision. Introducing \[facility]\"" & HEALTH SERVICES! Yeni Correia introduces [employer] patient portal. Now you can access parts of your medical record, email your doctor's office, and request medication refills online. 1. In your internet browser, go to [email or web address] 2. Click on the First Time User? Click Here link in the Sign In box. You will see the New Member Sign Up page. 3. Enter your [employer] Access Code exactly as it appears below. You will not need to use this code after youve completed the sign-up process. If you do not sign up before the expiration date, you must request a new code. · [employer] Access Code: [email or web address] Expires: 5/16/2018  5:24 PM 
 
4. Enter the last four digits of your Social Security Number (xxxx) and Date of Birth (mm/dd/yyyy) as indicated and click Submit. You will be taken to the next sign-up page. 5. Create a [employer] ID. This will be your [employer] login ID and cannot be changed, so think of one that is secure and easy to remember. 6. Create a [employer] password. You can change your password at any time. 7. Enter your Password Reset Question and Answer. This can be used at a later time if you forget your password. 8. Enter your e-mail address. You will receive e-mail notification when new information is available in [address] 9. Click Sign Up. You can now view and download portions of your medical record. 10. Click the Download Summary menu link to download a portable copy of your medical information. If you have questions, please visit the Frequently Asked Questions section of the [employer] website. Remember, [employer] is NOT to be used for urgent needs. For medical emergencies, dial 911. Now available from your iPhone and Android! Please provide this summary of care documentation to your next provider. Your primary care clinician is listed as [facility]. If you have any questions after today's visit, please call [phone].

## 2018-04-30 ENCOUNTER — APPOINTMENT (OUTPATIENT)
Dept: PHYSICAL THERAPY | Age: 58
End: 2018-04-30